# Patient Record
Sex: MALE | Race: WHITE | Employment: OTHER | URBAN - METROPOLITAN AREA
[De-identification: names, ages, dates, MRNs, and addresses within clinical notes are randomized per-mention and may not be internally consistent; named-entity substitution may affect disease eponyms.]

---

## 2017-04-05 ENCOUNTER — HOSPITAL ENCOUNTER (OUTPATIENT)
Facility: HOSPITAL | Age: 57
Setting detail: OBSERVATION
Discharge: HOME | End: 2017-04-06
Attending: EMERGENCY MEDICINE | Admitting: HOSPITALIST
Payer: MEDICARE

## 2017-04-05 ENCOUNTER — APPOINTMENT (OUTPATIENT)
Dept: GENERAL RADIOLOGY | Facility: HOSPITAL | Age: 57
End: 2017-04-05
Payer: MEDICARE

## 2017-04-05 DIAGNOSIS — Z86.79 HISTORY OF CORONARY ARTERY DISEASE: ICD-10-CM

## 2017-04-05 DIAGNOSIS — R07.9 ACUTE CHEST PAIN: Primary | ICD-10-CM

## 2017-04-05 PROCEDURE — 99220 INITIAL OBSERVATION CARE,LEVL III: CPT | Performed by: HOSPITALIST

## 2017-04-05 PROCEDURE — 71010 XR CHEST AP PORTABLE  (CPT=71010): CPT

## 2017-04-05 RX ORDER — NICOTINE 21 MG/24HR
1 PATCH, TRANSDERMAL 24 HOURS TRANSDERMAL DAILY
Status: DISCONTINUED | OUTPATIENT
Start: 2017-04-05 | End: 2017-04-06

## 2017-04-05 RX ORDER — PANTOPRAZOLE SODIUM 40 MG/1
40 TABLET, DELAYED RELEASE ORAL
Status: DISCONTINUED | OUTPATIENT
Start: 2017-04-06 | End: 2017-04-05

## 2017-04-05 RX ORDER — ATORVASTATIN CALCIUM 80 MG/1
80 TABLET, FILM COATED ORAL NIGHTLY
COMMUNITY
End: 2020-06-15

## 2017-04-05 RX ORDER — SULFASALAZINE 500 MG/1
500 TABLET ORAL 2 TIMES DAILY
COMMUNITY
End: 2020-04-07 | Stop reason: ALTCHOICE

## 2017-04-05 RX ORDER — PREDNISONE 1 MG/1
5 TABLET ORAL DAILY
COMMUNITY
End: 2020-04-07 | Stop reason: ALTCHOICE

## 2017-04-05 RX ORDER — NITROGLYCERIN 0.4 MG/1
0.4 TABLET SUBLINGUAL EVERY 5 MIN PRN
Status: DISCONTINUED | OUTPATIENT
Start: 2017-04-05 | End: 2017-04-06

## 2017-04-05 RX ORDER — METOPROLOL SUCCINATE 50 MG/1
50 TABLET, EXTENDED RELEASE ORAL DAILY
Status: ON HOLD | COMMUNITY
End: 2017-04-06

## 2017-04-05 RX ORDER — LAMOTRIGINE 25 MG/1
25 TABLET ORAL NIGHTLY
Status: ON HOLD | COMMUNITY
End: 2017-04-06

## 2017-04-05 RX ORDER — BUPROPION HYDROCHLORIDE 150 MG/1
450 TABLET, EXTENDED RELEASE ORAL DAILY
COMMUNITY
End: 2017-04-06

## 2017-04-05 RX ORDER — ONDANSETRON 2 MG/ML
4 INJECTION INTRAMUSCULAR; INTRAVENOUS EVERY 6 HOURS PRN
Status: DISCONTINUED | OUTPATIENT
Start: 2017-04-05 | End: 2017-04-06

## 2017-04-05 RX ORDER — DIAZEPAM 10 MG/1
20 TABLET ORAL NIGHTLY PRN
Status: DISCONTINUED | OUTPATIENT
Start: 2017-04-05 | End: 2017-04-06

## 2017-04-05 RX ORDER — GABAPENTIN 300 MG/1
600 CAPSULE ORAL NIGHTLY
Status: DISCONTINUED | OUTPATIENT
Start: 2017-04-05 | End: 2017-04-06

## 2017-04-05 RX ORDER — PANTOPRAZOLE SODIUM 40 MG/1
40 TABLET, DELAYED RELEASE ORAL
COMMUNITY

## 2017-04-05 RX ORDER — ASPIRIN 81 MG/1
81 TABLET ORAL NIGHTLY
COMMUNITY

## 2017-04-05 RX ORDER — ATORVASTATIN CALCIUM 40 MG/1
80 TABLET, FILM COATED ORAL NIGHTLY
Status: DISCONTINUED | OUTPATIENT
Start: 2017-04-05 | End: 2017-04-05

## 2017-04-05 RX ORDER — GABAPENTIN 600 MG/1
600 TABLET ORAL NIGHTLY
Status: DISCONTINUED | OUTPATIENT
Start: 2017-04-05 | End: 2017-04-05

## 2017-04-05 RX ORDER — HEPARIN SODIUM 5000 [USP'U]/ML
5000 INJECTION, SOLUTION INTRAVENOUS; SUBCUTANEOUS EVERY 12 HOURS
Status: DISCONTINUED | OUTPATIENT
Start: 2017-04-05 | End: 2017-04-06

## 2017-04-05 RX ORDER — ATORVASTATIN CALCIUM 40 MG/1
80 TABLET, FILM COATED ORAL NIGHTLY
Status: DISCONTINUED | OUTPATIENT
Start: 2017-04-05 | End: 2017-04-06

## 2017-04-05 RX ORDER — ACETAMINOPHEN 325 MG/1
650 TABLET ORAL EVERY 6 HOURS PRN
Status: DISCONTINUED | OUTPATIENT
Start: 2017-04-05 | End: 2017-04-06

## 2017-04-05 RX ORDER — PANTOPRAZOLE SODIUM 40 MG/1
40 TABLET, DELAYED RELEASE ORAL 2 TIMES DAILY
Status: DISCONTINUED | OUTPATIENT
Start: 2017-04-05 | End: 2017-04-06

## 2017-04-05 RX ORDER — GABAPENTIN 600 MG/1
600 TABLET ORAL NIGHTLY
COMMUNITY

## 2017-04-05 RX ORDER — FOLIC ACID 1 MG/1
1 TABLET ORAL NIGHTLY
Status: DISCONTINUED | OUTPATIENT
Start: 2017-04-05 | End: 2017-04-05

## 2017-04-05 RX ORDER — DIAZEPAM 10 MG/1
20 TABLET ORAL NIGHTLY PRN
COMMUNITY

## 2017-04-05 RX ORDER — FOLIC ACID 1 MG/1
1 TABLET ORAL NIGHTLY
Status: DISCONTINUED | OUTPATIENT
Start: 2017-04-05 | End: 2017-04-06

## 2017-04-05 RX ORDER — ASPIRIN 325 MG
325 TABLET ORAL DAILY
Status: DISCONTINUED | OUTPATIENT
Start: 2017-04-05 | End: 2017-04-06

## 2017-04-05 NOTE — ED PROVIDER NOTES
Patient Seen in: [facility] AND [facility] Emergency Department    History   Patient presents with:  Chest Pain Angina (cardiovascular)    Stated Complaint:     HPI    Patient is a 19-year-old male with past history of hyperlipidemia, coronary artery disease, st capsule every other week   Folic Acid 1 MG Oral Tab,  Take 1 Tab by mouth daily. Patient taking differently: Take 1 mg by mouth nightly.      PLAVIX 75 MG OR TABS,  1 TABLET DAILY   Metaxalone (SKELAXIN) 800 MG Oral Tab,  1 TABLET at night       Family His patient's motor strength is 5 out of 5 and symmetric in the upper and lower extremities bilaterally  Extremities: No focal swelling or tenderness  Skin: No pallor, no redness or warmth to the touch      ED Course     Labs Reviewed   COMP METABOLIC PANEL (1 intervals and axes as noted on EKG Report. Rate: 55  Rhythm: Sinus Rhythm  Reading: Since EKG demonstrates a sinus bradycardia with a rate of 55. The patient's axis and intervals are normal.  There are nonspecific ST-T wave changes.   When compared to an

## 2017-04-06 ENCOUNTER — APPOINTMENT (OUTPATIENT)
Dept: NUCLEAR MEDICINE | Facility: HOSPITAL | Age: 57
End: 2017-04-06
Attending: HOSPITALIST
Payer: MEDICARE

## 2017-04-06 ENCOUNTER — APPOINTMENT (OUTPATIENT)
Dept: CV DIAGNOSTICS | Facility: HOSPITAL | Age: 57
End: 2017-04-06
Attending: HOSPITALIST
Payer: MEDICARE

## 2017-04-06 VITALS
BODY MASS INDEX: 25.85 KG/M2 | SYSTOLIC BLOOD PRESSURE: 120 MMHG | HEIGHT: 64.8 IN | HEART RATE: 74 BPM | DIASTOLIC BLOOD PRESSURE: 69 MMHG | TEMPERATURE: 98 F | OXYGEN SATURATION: 96 % | RESPIRATION RATE: 16 BRPM | WEIGHT: 155.13 LBS

## 2017-04-06 PROCEDURE — 93306 TTE W/DOPPLER COMPLETE: CPT

## 2017-04-06 PROCEDURE — 93016 CV STRESS TEST SUPVJ ONLY: CPT | Performed by: INTERNAL MEDICINE

## 2017-04-06 PROCEDURE — 93018 CV STRESS TEST I&R ONLY: CPT | Performed by: INTERNAL MEDICINE

## 2017-04-06 PROCEDURE — 93306 TTE W/DOPPLER COMPLETE: CPT | Performed by: INTERNAL MEDICINE

## 2017-04-06 PROCEDURE — 78452 HT MUSCLE IMAGE SPECT MULT: CPT | Performed by: HOSPITALIST

## 2017-04-06 PROCEDURE — 93017 CV STRESS TEST TRACING ONLY: CPT | Performed by: HOSPITALIST

## 2017-04-06 PROCEDURE — 99217 OBSERVATION CARE DISCHARGE: CPT | Performed by: HOSPITALIST

## 2017-04-06 RX ORDER — SODIUM CHLORIDE 9 MG/ML
INJECTION, SOLUTION INTRAVENOUS
Status: DISCONTINUED
Start: 2017-04-06 | End: 2017-04-06

## 2017-04-06 RX ORDER — METOPROLOL SUCCINATE 25 MG/1
25 TABLET, EXTENDED RELEASE ORAL
Qty: 60 TABLET | Refills: 0 | Status: SHIPPED | OUTPATIENT
Start: 2017-04-06 | End: 2020-08-13

## 2017-04-06 RX ORDER — METOPROLOL SUCCINATE 25 MG/1
25 TABLET, EXTENDED RELEASE ORAL
Status: DISCONTINUED | OUTPATIENT
Start: 2017-04-06 | End: 2017-04-06

## 2017-04-06 NOTE — ED NOTES
Patient ambulated steadily to the bathroom without difficulty breathing, dizziness or pain. Placed back on cardiac monitor at this time- vitals stable. Room assignment made and tele box ordered. Two hour trop/ecg to be completed at this time.  Patient accep

## 2017-04-06 NOTE — CONSULTS
[facility]    PATIENT'S NAME: Ghislaine Gomez   ATTENDING PHYSICIAN: Akilah Roca MD   CONSULTING PHYSICIAN: Jennifer Parham MD   PATIENT ACCOUNT#:   [id number]    LOCATION:  [address] [address] #:   [id number]       DATE OF are no new allergies. There is no easy bruising. There is a history of anxiety. There is a history of depression. There is no history of stroke. PHYSICAL EXAMINATION:    GENERAL:  He is a male who currently appears comfortable.   VITAL SIGNS:  Temp

## 2017-04-06 NOTE — IMAGING NOTE
Patient is here for Regadenoson stress test. Patient is wearing a nicoderm patch. Test changed to Nuclear Stress test per hospitalist Exrcised for 6 minutes and 30 seconds. Denies any chest discomfort. ECG changes noted. Wet read ordered per protocol. Pls re

## 2017-04-06 NOTE — ED NOTES
ECG and 2 hour Trop completed; lab test in process. Tele box applied. Report given to floor. Patient updated and accepting of plan. Transport requested at this time.

## 2017-04-06 NOTE — H&P
[facility]    PATIENT'S NAME: Mihir Garcia   ATTENDING PHYSICIAN: Jose Mireles MD   PATIENT ACCOUNT#:   [id number]    LOCATION:  [place] [address]  MEDICAL RECORD #:   [id number]       YOB: 1960  ADMISSION DATE:       04/05 having chest discomfort on physical activity but, again, he has a very sedentary lifestyle. The patient denies any other symptoms. Other 12-point review of systems is negative.         PHYSICAL EXAMINATION:    GENERAL:  Alert and oriented to time, place a

## 2017-04-06 NOTE — PAYOR COMM NOTE
History and Physical      H&P filed by Sandford Duane, MD at 4/5/2017  7:19 PM / Draft: Not Electronically Signed      Author: Sandford Duane, MD Service: (none) Author Type: Physician     Filed: 4/5/2017  7:19 PM Note Time: 4/5/2017  5:51 PM Status: Ivan Samano smokes a half a pack to a pack a day.  Sedentary lifestyle.  No significant alcohol intake.  Lives with his family, usually independent in his basic activities of daily living.     REVIEW OF SYSTEMS:  The patient had an episode of midsternal chest tightness obtain Lexiscan stress test in the morning and 2-D echocardiogram with Doppler.  Continue his home medications.  Further recommendations to follow.      Dictated By Shameka Payton MD  d:     04/05/2017 17:51:15  t:      04/05/2017 19:00:36  [facility]   [id number]/

## 2017-04-06 NOTE — PROGRESS NOTES
HS medications continued per .  MORNING medications need reconciliation by morning hospitalist. Thank you.

## 2017-04-07 NOTE — DISCHARGE SUMMARY
[facility]    PATIENT'S NAME: Judah Simmons   ATTENDING PHYSICIAN: Akilah Roca MD   PATIENT ACCOUNT#:   [id number]    LOCATION:  [address] [place] [address] RECORD #:   [id number]       YOB: 1960  ADMISSION DATE:       04/05 treatment. Follow up with home MD.  2.   Coronary artery disease, status post right coronary artery stent 10 years ago. The patient still smokes. Encouraged to quit. 3.   Rheumatoid arthritis. 4.   Hypertension. 5.   Nicotine abuse.   6.   Bipolar diso

## (undated) NOTE — IP AVS SNAPSHOT
[address]  [address] [facility] [phone]                Discharge Summary   4/5/2017    Padmini Lea           Admission Information        Provider Department    4/5/2017 Angel Caicedo MD E Take 1 tablet (25 mg total) by mouth Daily Beta Blocker. Akilah Roca                           predniSONE 5 MG Tabs   Commonly known as:  Carlos Weinstein   What changed:  Another medication with the same name was removed.  Continue taking this med Commonly known as:  AZULFIDINE   Next dose due:  4/6/17        Take 500 mg by mouth 2 (two) times daily.                                  STOP taking these medications     Citalopram Hydrobromide 40 MG Tabs   Commonly known as:  CeleXA           ClonazePAM medications prescribed for you. Read the directions carefully, and ask your doctor or other care provider to review them with you.       CONTINUE taking these medications          aspirin 81 MG Tbec       Atorvastatin Calcium 80 MG Tabs   Commonly known as: Bring a paper prescription for each of these medications    - Metoprolol Succinate ER 25 MG Tb24          Discharge References/Attachments     CHEST PAIN, NONCARDIAC  (ENGLISH)      Follow-up Information     Follow up with Dora Arredondo MD In 1 week. Metabolic Lab Results  (Last result in the past 90 days)    ALT Bilirubin,Total Total Protein Albumin Sodium Potassium Chloride    (04/05/17)  19 (04/05/17)  0.6 (04/05/17)  6.4 (04/05/17)  3.6 (04/06/17)  142 (04/06/17)  3.6 (04/06/17)  109      Radiology _____________________________________________________________________________    Medication Side Effects - Medications to be taken at home  As your caregivers, we want you to be aware of the medications you are prescribed to take and their potential SIDE E Blood Producing Medications     Folic Acid 1 MG Oral Tab       Use: Increase blood cell counts   Most common side effects: Pain, fever, rash, fatigue, joint pain, blood clots, high blood pressure   What to report to your healthcare team: Pain, swelling, ra Use: Treat conditions such as depression and thought disorders   Most common side effects: Dizziness, drowsiness, problems with movement   What to report to your healthcare team: Changes in thinking, talking or movement, drowsiness, shaking           Calm